# Patient Record
Sex: FEMALE | Race: WHITE | Employment: FULL TIME | URBAN - METROPOLITAN AREA
[De-identification: names, ages, dates, MRNs, and addresses within clinical notes are randomized per-mention and may not be internally consistent; named-entity substitution may affect disease eponyms.]

---

## 2020-01-25 LAB
CHOLESTEROL, TOTAL: 160 MG/DL
CHOLESTEROL/HDL RATIO: 2.3
HDLC SERPL-MCNC: 71 MG/DL (ref 35–70)
LDL CHOLESTEROL CALCULATED: 78 MG/DL (ref 0–160)
NONHDLC SERPL-MCNC: ABNORMAL MG/DL
TRIGL SERPL-MCNC: 53 MG/DL
VLDLC SERPL CALC-MCNC: 10.6 MG/DL

## 2021-04-24 LAB
AVERAGE GLUCOSE: 126
CREATININE, URINE: 181.4
HBA1C MFR BLD: 5.7 %
MICROALBUMIN/CREAT 24H UR: 0.7 MG/G{CREAT}
MICROALBUMIN/CREAT UR-RTO: 4

## 2021-08-23 ENCOUNTER — CLINICAL DOCUMENTATION (OUTPATIENT)
Dept: PHARMACY | Facility: CLINIC | Age: 61
End: 2021-08-23

## 2021-08-23 NOTE — PROGRESS NOTES
Pharmacy Pop Care Documentation:   Patient is missing the following requirements: DX: DM Type One or Type Two;  Provider Documentation of DM Visit; A1C; Lipid Panel; Urine Albumin; MyChart account creation: code sent via email provided. If completed by 8/25/21 patient will be eligible for enrollment in the DM Program on 9/01/21. Application Received: via RIDERS. Patient does not have an active CrossChx account. E-Mail sent to patient with the above information.     Colin Telles, Via [employer]   Department, toll free: [phone] Option #3

## 2021-08-23 NOTE — LETTER
Danyel [address]  [address], [address]  Phone: toll free [phone] Option #3        Nohemi Peck   [address] [address]           08/26/21     Dear Shannon Villegas! You have successfully enrolled in the Be Well With Diabetes program for 2021. What you receive  Beginning September 1, you will begin receiving up to $300 in waived copays for specific medications and pharmacy-related supplies purchased through our home delivery pharmacy, [facility]. A list of eligible medications and pharmacy supplies can be found at [employer]ate under Be Well With Diabetes. In addition, youll receive advice and help from our pharmacists, associate care management team and diabetes educators. (And, if you also participate in the Be Well program, you can earn points and Lifestyle Management or Health Management program credit, if applicable.)    What you need to do  To maintain your benefit this year and remain eligible next year, complete the following requirements:      *Can be satisfied through [employer] [facility] Screening on-site. **Requirements to enroll must be completed within 6 months of enrollment date **Pneumonia vaccine is dependent on previous immunization and your age. Remember, program requirements must be completed by deadlines shown. If not, your benefit may be terminated, and you will not be eligible to participate again until the following year. To keep you on track, well review your Fluential account and send reminders for action. (If you dont have a [address], submit documentation to [email or web address] or by fax to [phone].)    Congratulations and thank you for taking steps to improve your health and to Be Well With Diabetes. [facility] [facility]  Phone: [phone] Option #3  Email: [email or web address]  Fax: [phone]

## 2021-08-26 ENCOUNTER — CLINICAL DOCUMENTATION (OUTPATIENT)
Dept: PHARMACY | Facility: CLINIC | Age: 61
End: 2021-08-26

## 2021-08-26 NOTE — PROGRESS NOTES
Patient is a [employer] employee new to [facility]. Received documentation of the followin21: DM OV; DX: DM Type Two  21: A1C and Urine Albumin Results  20: Lipid Panel    Patient has upcoming appointment with new Provider on 21 at 4 PM and will send documentation of this visit to us as soon as possible. Patient needs  Lipid panel results but will enroll her into the DM Program on 21 and advise to complete this test as soon as possible. Pharmacy Pop Care Documentation:   Patient will be enrolled in the DM Program on 21. Letter and e-mail sent to patient.     Yanick Bray, Via [employer]   Department, toll free: [phone] Option #3

## 2021-08-27 LAB
CREATININE, URINE: 166.6
MICROALBUMIN/CREAT 24H UR: 1.3 MG/G{CREAT}
MICROALBUMIN/CREAT UR-RTO: 8

## 2021-09-01 ENCOUNTER — ENROLLMENT (OUTPATIENT)
Dept: PHARMACY | Facility: CLINIC | Age: 61
End: 2021-09-01

## 2021-09-02 ENCOUNTER — CLINICAL DOCUMENTATION (OUTPATIENT)
Dept: PHARMACY | Facility: CLINIC | Age: 61
End: 2021-09-02

## 2021-09-03 LAB
AVERAGE GLUCOSE: 123
CHOLESTEROL, TOTAL: 127 MG/DL
CHOLESTEROL/HDL RATIO: 2
CREATININE, URINE: 166.6
HBA1C MFR BLD: 5.9 %
HDLC SERPL-MCNC: 65 MG/DL (ref 35–70)
LDL CHOLESTEROL CALCULATED: 53 MG/DL (ref 0–160)
MICROALBUMIN/CREAT 24H UR: 1.3 MG/G{CREAT}
MICROALBUMIN/CREAT UR-RTO: 8
NONHDLC SERPL-MCNC: NORMAL MG/DL
TRIGL SERPL-MCNC: 47 MG/DL
VLDLC SERPL CALC-MCNC: 9.4 MG/DL

## 2021-09-14 ENCOUNTER — TELEPHONE (OUTPATIENT)
Dept: PHARMACY | Facility: CLINIC | Age: 61
End: 2021-09-14

## 2021-09-14 NOTE — TELEPHONE ENCOUNTER
Called patient to schedule 2021 yearly pharmacist appointment to discuss medications for Diabetes Management Program.    No answer. Left VM. Please call back at [phone], option #3     [employer] message sent to patient.       Drema Epley, [address]   Phone: [phone], option #3

## 2021-09-15 ENCOUNTER — CLINICAL DOCUMENTATION (OUTPATIENT)
Dept: PHARMACY | Facility: CLINIC | Age: 61
End: 2021-09-15

## 2021-09-15 NOTE — PROGRESS NOTES
Received the following documentation via fax/e-mail:  Health Provider Diabetes Screening Form  8/27/21: DM OV Summary: Burke Dickson DO  9/03/21: A1C, Lipid Panel, and Urine Albumin Results. Documentation has been scanned into patients EMR under the Media Tab.     Lucero Colvin, Via [facility]   Department, toll free: [phone] Option #3

## 2021-09-16 ENCOUNTER — SCHEDULED TELEPHONE ENCOUNTER (OUTPATIENT)
Dept: PHARMACY | Facility: CLINIC | Age: 61
End: 2021-09-16

## 2021-09-16 RX ORDER — FLUTICASONE PROPIONATE 50 MCG
1 SPRAY, SUSPENSION (ML) NASAL DAILY
COMMUNITY

## 2021-09-16 RX ORDER — SEMAGLUTIDE 1.34 MG/ML
1 INJECTION, SOLUTION SUBCUTANEOUS WEEKLY
COMMUNITY

## 2021-09-16 RX ORDER — BUPROPION HYDROCHLORIDE 150 MG/1
150 TABLET ORAL EVERY MORNING
COMMUNITY
End: 2022-09-22

## 2021-09-16 NOTE — TELEPHONE ENCOUNTER
[facility] HEALTH CLINICAL PHARMACY REVIEW - Be Well with Diabetes    Dione Modi is a 63 y.o. female enrolled in the [address] [place] [address] Diabetes Program.  Patient enrolled 9/1/21. Medications:  Current Outpatient Medications   Medication Sig Dispense Refill    Multiple Vitamins-Minerals (BARIATRIC MULTIVITAMINS/IRON PO) Take 1 tablet by mouth daily      Semaglutide, 1 MG/DOSE, (OZEMPIC, 1 MG/DOSE,) 4 MG/3ML SOPN Inject 1 mg into the skin once a week      buPROPion (WELLBUTRIN XL) 150 MG extended release tablet Take 150 mg by mouth every morning      fluticasone (FLONASE) 50 MCG/ACT nasal spray 1 spray by Each Nostril route daily       No current facility-administered medications for this visit. Current Pharmacy: [facility]  Current testing supplies/frequency: Prodigy meter and supplies. Patient says she is typically testing once daily in the morning. She did say she was having issues with getting her strips to register and would sometimes have to go through 2 or 3 of them. She is going to try a different can of strips and if still having issues I offered to get her agamtrix presto instead. Pen needles/syringes: n/a    Allergies: Allergies   Allergen Reactions    Metformin And Related Nausea Only      Vitals/Labs:  BP: 120/68mmhg at last OV in August    Microalbumin/SCr- WNL per scanned media    Lab Results   Component Value Date    LABA1C 5.9 09/03/2021    LABA1C 5.7 04/24/2021     Lab Results   Component Value Date    CHOL 127 09/03/2021    TRIG 47 09/03/2021    HDL 65 09/03/2021    LDLCALC 53 09/03/2021     No results found for: ALT, AST  The ASCVD Risk score (Rolf Gonzales., et al., 2013) failed to calculate for the following reasons:     The systolic blood pressure is missing    The valid total cholesterol range is 130 to 320 mg/dL    The smoking status is invalid    Unable to determine if patient is Non-      EGFR: >90 per scanned report    Immunizations:  Immunization History   Administered Date(s) Administered    Pneumococcal Polysaccharide ([id number]) 2014      Social History:  Social History     Tobacco Use    Smoking status: Not on file   Substance Use Topics    Alcohol use: Not on file     ASSESSMENT:  Initial Program Requirements (Y indicates has completed for the year, N indicates needs to be completed by- n/a- included for completeness):  Yes - Provider Visit for DM (1st)  Yes - A1c (1st)     Ongoing Program Requirements (Y indicates has completed for the year, N indicates needs to be completed by 2021):  Yes - Provider Visit for DM (2nd)  Yes - ACC/diabetes educator visit (if A1c over 8%)  Yes - A1c (2nd)  Yes - Lipid panel  Yes - Urine microalbumin  Yes - Pneumococcal vaccination: Up-to-date, not needed again until age 71  Yes - Influenza vaccination for 2021-  ICONIC required   Yes - Medication adherence over 70%  No - On statin or contraindication(s) LDL < 70 mg/dL  No - On ACEi/ARB or contraindication(s) Normal blood pressure, urinary albumin-to-creatinine ratio, and eGFR       Current medications eligible for copay waiver, up to $300 (600$ for full year), through [facility]:  - [facility]     Diabetes Care:   - Glycemic Goal: <6.5%. Stable and at blood glucose goal. Type 2 DM under excellent control as evidenced by multiple a1c <6%. - Home blood sugar records:  trend: stable, patient tests 1 time(s) per day  - Any episodes of hypoglycemia? Very Rare- Not using insulin, but says she has occasional readings in the low 70's. Likely due to bariatric surgery. Not concerning.  - Patient said that she had been on low dose lantus in the past. She would use on vary rare occasions. She reports that what she had was  and I advised against using it this way. Her a1c is already nearly perfect. - Patient has been under great control for quite some time.   The only

## 2021-10-14 ENCOUNTER — CLINICAL DOCUMENTATION (OUTPATIENT)
Dept: PHARMACY | Facility: CLINIC | Age: 61
End: 2021-10-14

## 2021-10-14 NOTE — PROGRESS NOTES
Pharmacy Pop Care Documentation:     AVS received for required office visit on: 8/27/21: Brandie Huang, DO - scanned on 9/15/21 into Media Tab

## 2022-01-05 ENCOUNTER — TELEPHONE (OUTPATIENT)
Dept: PHARMACY | Facility: CLINIC | Age: 62
End: 2022-01-05

## 2022-01-05 NOTE — TELEPHONE ENCOUNTER
2022 Annual Pharmacist Visit    Called patient to schedule 2022 yearly pharmacist appointment to discuss medications for Diabetes Management Program.    Spoke to patient and appointment scheduled for 1/14 at [facility].   Rita [address]   Department, toll free: [phone] Option #3    For Pharmacy Admin Tracking Only     CPA in place:  No   Recommendation Provided To: Patient/Caregiver: 1 via Telephone   Intervention Detail: Scheduled Appointment   Gap Closed?: Yes    Intervention Accepted By: Patient/Caregiver: 1   Time Spent (min): 15

## 2022-01-14 ENCOUNTER — SCHEDULED TELEPHONE ENCOUNTER (OUTPATIENT)
Dept: PHARMACY | Facility: CLINIC | Age: 62
End: 2022-01-14

## 2022-01-14 VITALS — DIASTOLIC BLOOD PRESSURE: 68 MMHG | SYSTOLIC BLOOD PRESSURE: 120 MMHG

## 2022-01-14 NOTE — TELEPHONE ENCOUNTER
[facility] HEALTH CLINICAL PHARMACY REVIEW - Be Well with Diabetes    Dione Camarena is a 57 y.o. female enrolled in the [address] [place] [address] Diabetes Program. Patient provided Ninoska Narayan with verbal consent to remain in the program for this year. Patient enrolled 9/1/2021. Medications:  Medication Sig    Multiple Vitamins-Minerals (BARIATRIC MULTIVITAMINS/IRON PO) Take 1 tablet by mouth daily    Semaglutide, 1 MG/DOSE, (OZEMPIC, 1 MG/DOSE,) 4 MG/3ML SOPN  · Covered by the DM program Inject 1 mg into the skin once a week    buPROPion (WELLBUTRIN XL) 150 MG extended release tablet Take 150 mg by mouth every morning    fluticasone (FLONASE) 50 MCG/ACT nasal spray 1 spray by Each Nostril route daily      Current Pharmacy[facility]  Current testing supplies/frequency: Prodigy - tests a fasting blood glucose in the morning. Allergies: Allergies   Allergen Reactions    Metformin And Related Nausea Only      Vitals/Labs:  BP Readings from Last 3 Encounters:   08/27/21 120/68     No results found for: Catherine Fox     Lab Results   Component Value Date    LABA1C 5.9 09/03/2021    LABA1C 5.7 04/24/2021     Lab Results   Component Value Date    CHOL 127 09/03/2021    TRIG 47 09/03/2021    HDL 65 09/03/2021    LDLCALC 53 09/03/2021     No results found for: ALT, AST  The ASCVD Risk score (Aaron Travis., et al., 2013) failed to calculate for the following reasons:     The systolic blood pressure is missing    The valid total cholesterol range is 130 to 320 mg/dL    The smoking status is invalid    Unable to determine if patient is Non-      Immunizations:  Immunization History   Administered Date(s) Administered    Pneumococcal Polysaccharide ([id number]) 01/01/2014      Social History:  Social History     Tobacco Use    Smoking status: Not on file    Smokeless tobacco: Not on file   Substance Use Topics    Alcohol use: Not on file ASSESSMENT:  Initial Program Requirements (Y indicates has completed for the year, N indicates needs to be completed by 07/01/2022): No - Provider Visit for DM (1st)  No - A1c (1st)     Ongoing Program Requirements (Y indicates has completed for the year, N indicates needs to be completed by 12/31/2022): No - Provider Visit for DM (2nd)  No - ACC/diabetes educator visit (if A1c over 8%)  No - A1c (2nd)  No - Lipid panel  No - Urine microalbumin  Yes - Pneumococcal vaccination: Up-to-date, not needed again until age 71  No - Influenza vaccination for Fall 2022  No - Medication adherence over 70%  Yes - On statin or contraindication(s) LDL < 70 mg/dL  Yes - On ACEi/ARB or contraindication(s) Normal blood pressure, urinary albumin-to-creatinine ratio, and eGFR     Formulary Medication Review:  Non-formulary or medications with cost-effective alternatives: none. Current medications eligible for copay waiver, up to $600, through [employer]:  - Ozempic  - Prodigy     Diabetes Care:   - Glycemic Goal: <7.0%. Stable and at blood glucose goal. Type 2 DM under excellent control as evidenced by < 7%. - Current symptoms/problems include none  - Home blood sugar records:  fasting range: 100's  - Any episodes of hypoglycemia? no  - Known diabetic complications: none  - Duplicate MOA: none  - Therapy Optimization: meeting goals. - De-escalation of Therapy: could consider decreasing Ozempic dose if still meeting goals. - Daily aspirin? No: not indicated. History of stomach cancer. Other Considerations:  - Blood Pressure Goal: BP less than 140/90 mmHg due to history of DM: Is at blood pressure goal.   - Lipids: LDL < 70  - Smoking status: Never smoked    PLAN:  - Consideration(s) for provider:   · None at this time.    - DM program gaps identified:   · Initial requirements: Provider Visit for DM (1st) and A1c (1st)   · Ongoing requirements: Provider visit for DM (2nd), ACC/diabetes educator visit (if A1c over 8%), A1c (2nd), Lipid panel, Urine microalbumin, Influenza vaccination for 9823-4333 and Medication adherence over 70%   - Follow up: PCP for identified gaps or as scheduled below  - Upcoming appointments:   No future appointments.     Werner LeosD, [facility] // Department, toll free [phone], option [address] [facility] [address] in place:  No    Gap Closed?: Yes    Time Spent (min): 60

## 2022-05-06 LAB
AVERAGE GLUCOSE: NORMAL
CHOLESTEROL, TOTAL: 149 MG/DL
CHOLESTEROL/HDL RATIO: 2
CREATININE, URINE: 237
HBA1C MFR BLD: 6.1 %
HDLC SERPL-MCNC: 75 MG/DL (ref 35–70)
LDL CHOLESTEROL CALCULATED: 66.4 MG/DL (ref 0–160)
MICROALBUMIN/CREAT 24H UR: 1.3 MG/G{CREAT}
MICROALBUMIN/CREAT UR-RTO: 5
NONHDLC SERPL-MCNC: ABNORMAL MG/DL
TRIGL SERPL-MCNC: 38 MG/DL
VLDLC SERPL CALC-MCNC: 7.6 MG/DL

## 2022-05-27 ENCOUNTER — CLINICAL DOCUMENTATION (OUTPATIENT)
Dept: PHARMACY | Facility: CLINIC | Age: 62
End: 2022-05-27

## 2022-05-27 NOTE — PROGRESS NOTES
Pharmacy Pop Care Documentation:     AVS received for required office visit on: 4/28/22: Navid Martinez D.O.

## 2022-06-21 RX ORDER — FLUCONAZOLE 150 MG/1
TABLET ORAL
COMMUNITY

## 2022-06-21 RX ORDER — BUPROPION HYDROCHLORIDE 150 MG/1
TABLET ORAL
COMMUNITY
Start: 2021-08-27

## 2022-06-21 RX ORDER — FLUTICASONE PROPIONATE 50 MCG
SPRAY, SUSPENSION (ML) NASAL
COMMUNITY
Start: 2021-08-27

## 2022-06-21 RX ORDER — ALBUTEROL SULFATE 90 UG/1
AEROSOL, METERED RESPIRATORY (INHALATION)
COMMUNITY
Start: 2021-12-17

## 2022-06-21 RX ORDER — AZITHROMYCIN 250 MG/1
TABLET, FILM COATED ORAL
COMMUNITY

## 2022-06-21 RX ORDER — IBUPROFEN 800 MG/1
TABLET ORAL
COMMUNITY

## 2022-06-21 RX ORDER — ATOMOXETINE 18 MG/1
CAPSULE ORAL
COMMUNITY
Start: 2022-04-28

## 2022-06-21 RX ORDER — BENZONATATE 200 MG/1
1 CAPSULE ORAL
COMMUNITY

## 2022-06-21 RX ORDER — LANSOPRAZOLE 30 MG/1
CAPSULE, DELAYED RELEASE ORAL
COMMUNITY

## 2022-06-28 RX ORDER — PREDNISONE 10 MG/1
TABLET ORAL
COMMUNITY

## 2022-06-28 RX ORDER — OMEPRAZOLE 40 MG/1
1 CAPSULE, DELAYED RELEASE ORAL
COMMUNITY

## 2022-11-17 ENCOUNTER — TELEPHONE (OUTPATIENT)
Dept: PHARMACY | Facility: CLINIC | Age: 62
End: 2022-11-17

## 2022-11-17 NOTE — TELEPHONE ENCOUNTER
[employer] patient called stating that her records recently switched to epic     Patients missing requirements for BWWD were  2nd office visit in 2022 for DM   2nd a1c in 2022      Per chart OV completed on 9/22/22   2nd a1c completed on 10/1/22    Master spreadsheet  still showing missing those two requirements. Will route to P to review.  Let patient know that we have all the documentation in chart and if we have any questions we will reach out to her

## 2022-11-17 NOTE — TELEPHONE ENCOUNTER
Patient has completed all requirements. There was a reporting issue which made it look like there were things missing. Will be fixed by the end of the month    Patient aware. Will sign off    WBird Farley, PharmD, [address] [facility], toll free: [phone]

## 2023-02-24 ENCOUNTER — TELEPHONE (OUTPATIENT)
Dept: PHARMACY | Facility: CLINIC | Age: 63
End: 2023-02-24

## 2023-02-24 NOTE — TELEPHONE ENCOUNTER
[facility] Floor Employee Diabetes Program - Be Well With Diabetes  =================================================================  Levon Jauregui is a 60 y.o. female enrolled in the [address] [place] [address] with patient for yearly visit to discuss enrollment in program. Patient provided writer with verbal consent to remain in the program for this year. Program Requirements to be completed in 2023:  Two office visits in 2023 for diabetes (1st must be completed by 7/1/2023)  Two A1c levels in 2023 (1st must be completed by 7/1/2023)  Yearly lipid panel  Yearly urine microalbumin test  Diabetes education if A1c is over 8%  Taking an ACE/ARB medication if appropriate  Taking a statin medication if appropriate  Pneumonia vaccine up to date. Guidelines changed, talk with provider. Yearly flu shot (for 4997-3932 season)  Medication adherence over 70%. Patients who fall below 70% will be contacted by a pharmacist in the program to discuss any issues. Are you currently using [address] [place] [address] (home delivery pharmacy) to get your prescriptions? Yes    Would you like to speak with a pharmacist about the program, your diabetes, and/or your prescriptions? No    [facility] Clinical Pharmacy Team  Phone: toll free [phone], option #3  Fax [phone]  Email: [email or web address]       For Pharmacy Admin Tracking Only    Program: [address] in [address]:  No  Gap Closed?: Yes   Time Spent (min): 10

## 2023-06-08 ENCOUNTER — PATIENT MESSAGE (OUTPATIENT)
Dept: PHARMACY | Facility: CLINIC | Age: 63
End: 2023-06-08

## 2023-06-15 PROBLEM — D50.9 IRON DEFICIENCY ANEMIA: Status: ACTIVE | Noted: 2023-06-15

## 2023-06-15 PROBLEM — H73.893 RETRACTION OF TYMPANIC MEMBRANE OF BOTH EARS: Status: ACTIVE | Noted: 2023-06-15

## 2023-06-15 PROBLEM — Z13.29 SCREENING FOR THYROID DISORDER: Status: ACTIVE | Noted: 2023-06-15

## 2023-06-15 PROBLEM — C16.9: Status: ACTIVE | Noted: 2023-06-15

## 2023-06-15 PROBLEM — M67.442 MUCOUS CYST OF DIGIT OF LEFT HAND: Status: ACTIVE | Noted: 2023-06-15

## 2023-06-15 PROBLEM — G25.81 RESTLESS LEGS: Status: ACTIVE | Noted: 2023-06-15

## 2023-06-15 PROBLEM — Z98.84 HISTORY OF GASTRIC BYPASS: Status: ACTIVE | Noted: 2023-06-15

## 2023-06-15 PROBLEM — J20.9 ACUTE BRONCHITIS: Status: ACTIVE | Noted: 2023-06-15

## 2023-06-15 PROBLEM — I10 HYPERTENSION: Status: ACTIVE | Noted: 2023-06-15

## 2023-06-15 PROBLEM — E11.9 TYPE 2 DIABETES MELLITUS WITHOUT COMPLICATION (HCC): Status: ACTIVE | Noted: 2023-06-15

## 2023-06-15 PROBLEM — Z13.220 ENCOUNTER FOR SCREENING FOR LIPID DISORDER: Status: ACTIVE | Noted: 2023-06-15

## 2023-06-15 PROBLEM — R05.9 COUGH: Status: ACTIVE | Noted: 2023-06-15

## 2023-06-15 PROBLEM — Z85.9 HISTORY OF MALIGNANT NEOPLASM: Status: ACTIVE | Noted: 2023-06-15

## 2023-06-15 PROBLEM — R41.840 ATTENTION AND CONCENTRATION DEFICIT: Status: ACTIVE | Noted: 2023-06-15

## 2023-06-15 PROBLEM — E66.9 OBESITY: Status: ACTIVE | Noted: 2023-06-15

## 2023-06-15 PROBLEM — Z12.31 ENCOUNTER FOR SCREENING MAMMOGRAM FOR MALIGNANT NEOPLASM OF BREAST: Status: ACTIVE | Noted: 2023-06-15

## 2023-06-15 PROBLEM — G47.00 INSOMNIA: Status: ACTIVE | Noted: 2023-06-15

## 2023-06-15 PROBLEM — H92.03 ACUTE PAIN OF BOTH EARS: Status: ACTIVE | Noted: 2023-06-15

## 2023-06-15 PROBLEM — Z85.028 HISTORY OF MALIGNANT NEOPLASM OF STOMACH: Status: ACTIVE | Noted: 2023-06-15

## 2023-06-15 PROBLEM — D3A.092 CARCINOID TUMOR OF STOMACH: Status: ACTIVE | Noted: 2023-06-15

## 2023-06-15 PROBLEM — M67.449 MUCOUS CYST OF FINGER: Status: ACTIVE | Noted: 2023-06-15

## 2023-06-15 PROBLEM — J30.9 ALLERGIC RHINITIS: Status: ACTIVE | Noted: 2023-06-15

## 2023-06-15 PROBLEM — R06.2 WHEEZING: Status: ACTIVE | Noted: 2023-06-15

## 2023-06-15 PROBLEM — E80.6 DISORDER OF BILIRUBIN EXCRETION: Status: ACTIVE | Noted: 2023-06-15

## 2023-06-15 PROBLEM — Z78.0 POSTMENOPAUSAL STATE: Status: ACTIVE | Noted: 2023-06-15

## 2023-06-15 PROBLEM — M67.441 MUCOUS CYST OF DIGIT OF RIGHT HAND: Status: ACTIVE | Noted: 2023-06-15

## 2023-06-15 PROBLEM — D64.89 OTHER SPECIFIED ANEMIAS: Status: ACTIVE | Noted: 2023-06-15

## 2023-06-19 PROBLEM — R73.03 PREDIABETES: Status: ACTIVE | Noted: 2023-06-19

## 2023-06-22 NOTE — TELEPHONE ENCOUNTER
1st 2023 DM OV completed on 6/15/23 and 1st 2023 A1C completed: 6/17/23. [employer] message read by patient. Requirements due by 7/01/23 complete. No further patient outreach planned at this time.      Chloe Ervin Via [employer] [facility]   Department, toll free: [phone] Option #3    For Pharmacy Admin Tracking Only    Program: [address] in place:  No  Gap Closed?: Yes   Time Spent (min): 5

## 2023-07-03 ENCOUNTER — CLINICAL DOCUMENTATION (OUTPATIENT)
Dept: PHARMACY | Facility: CLINIC | Age: 63
End: 2023-07-03

## 2023-07-03 NOTE — PROGRESS NOTES
Pharmacy Pop Care Documentation:     AVS received for required office visit on:  6/15/23 with Aguilar Presley, APRN - NP    Visit in [address] [place] [address], unclear why it did not pull on report.     Violette Garcia, PharmD,  [facility]  Department, toll free: [phone]      For Pharmacy Admin Tracking Only    Program: [address] [place] [address]  Time Spent (min): 5

## 2023-07-15 PROBLEM — R05.9 COUGH: Status: RESOLVED | Noted: 2023-06-15 | Resolved: 2023-07-15

## 2023-07-15 PROBLEM — Z12.31 ENCOUNTER FOR SCREENING MAMMOGRAM FOR MALIGNANT NEOPLASM OF BREAST: Status: RESOLVED | Noted: 2023-06-15 | Resolved: 2023-07-15

## 2023-07-15 PROBLEM — R25.2 HAND CRAMP: Status: ACTIVE | Noted: 2023-07-15

## 2023-07-15 PROBLEM — M54.9 DORSALGIA: Status: ACTIVE | Noted: 2023-07-15

## 2023-07-15 PROBLEM — Z13.220 ENCOUNTER FOR SCREENING FOR LIPID DISORDER: Status: RESOLVED | Noted: 2023-06-15 | Resolved: 2023-07-15

## 2023-07-15 PROBLEM — R25.2 MUSCLE CRAMPING: Status: ACTIVE | Noted: 2023-07-15

## 2023-07-15 PROBLEM — Z13.29 SCREENING FOR THYROID DISORDER: Status: RESOLVED | Noted: 2023-06-15 | Resolved: 2023-07-15

## 2023-08-14 PROBLEM — Z00.00 PREVENTATIVE HEALTH CARE: Chronic | Status: ACTIVE | Noted: 2023-06-15

## 2023-08-14 PROBLEM — Z00.00 PREVENTATIVE HEALTH CARE: Status: ACTIVE | Noted: 2023-06-15

## 2023-09-25 ENCOUNTER — CLINICAL DOCUMENTATION (OUTPATIENT)
Dept: PHARMACY | Facility: CLINIC | Age: 63
End: 2023-09-25

## 2023-09-25 NOTE — PROGRESS NOTES
Pharmacy Pop Care Documentation:     AVS received for required office visit on:  8/14/23- visit in epic, unclear why not transferred to report    TIBURCIO Cao, PharmD,  [facility], toll free: [phone]

## 2023-10-06 ENCOUNTER — TELEPHONE (OUTPATIENT)
Dept: PHARMACY | Facility: CLINIC | Age: 63
End: 2023-10-06

## 2023-10-06 NOTE — TELEPHONE ENCOUNTER
[employer] Employee Diabetes Program    Oleg Huntley is a 60 y.o. female enrolled in the Live Well with Diabetes Program. The goal of this voluntary program is to help employees and covered dependents reach their health maintenance goals in regards to their diabetes diagnosis. According to our records, patient is missing the following requirement(s) that must be completed by December 31, 2023 to avoid discharge from the program:  Second A1c result in 2023- ordered in chart  Urine Microalbumin- ordered in chart     Plan:  [employer] message sent.     Shay Benjamin, PharmD,  [facility]  Department, toll free: [phone]    For Pharmacy Admin Tracking Only    Program: [address] [place] [address]  Time Spent (min): 5

## 2024-01-25 ENCOUNTER — TELEPHONE (OUTPATIENT)
Dept: PHARMACY | Facility: CLINIC | Age: 64
End: 2024-01-25

## 2024-01-25 NOTE — TELEPHONE ENCOUNTER
**Patient is [employer]**  Called patient to schedule 2024 yearly pharmacist appointment to discuss medications for Diabetes Management Program.    No answer. Left VM on TAD: Please call back at [phone] Option #3.       Mohinder Metz [facility]  Clinical Pharmacy   Department, toll free: [phone] Option #3

## 2024-02-01 NOTE — TELEPHONE ENCOUNTER
Second attempt made to contact patient to schedule 2024 yearly pharmacist appointment to discuss medications for Diabetes Management Program.    No answer. Left VM on TAD: Please call back at [phone] Option #3.     [employer] message sent to patient.    Mohinder Metz [facility]  Clinical Pharmacy   Department, toll free: [phone] Option #3      For Pharmacy Admin Tracking Only    Program: [place]  CPA in place:  No  Gap Closed?: No   Time Spent (min): 5

## 2024-02-21 NOTE — TELEPHONE ENCOUNTER
POP[facility]ATION [facility] CLINICAL PHARMACY REVIEW - Live Well with Diabetes Program ([employer])    Dione Bob is a 63 y.o. female enrolled in the [employer] Employee Diabetes Program. Patient provided writer with verbal consent to remain in the program for this year. Patient enrolled 9/1/21.      Medications:  Current Outpatient Medications   Medication Instructions    albuterol sulfate HFA (PROVENTIL;VENTOLIN;PROAIR) 108 (90 Base) MCG/ACT inhaler 2 puffs as needed Inhalation every 4 hrs prn    atomoxetine (STRATTERA) 40 mg, Oral, DAILY    blood glucose monitor strips 1 strip, Other, 3 times daily, Test 3 times a day & as needed for symptoms of irregular blood glucose. Dispense sufficient amount for indicated testing frequency plus additional to accommodate PRN testing needs.    fluticasone (FLONASE) 50 MCG/ACT nasal spray 1 spray, Each Nostril, DAILY    gabapentin (NEURONTIN) 100 mg, Oral, 2 TIMES DAILY, Intended supply: 90 days    ibuprofen (ADVIL;MOTRIN) 800 MG tablet - taking as needed 1 tablet with food or milk as needed Orally Three times a day    Multiple Vitamins-Minerals (BARIATRIC MULTIVITAMINS/IRON PO) 1 tablet, Oral, DAILY    Ozempic (1 MG/DOSE) 1 mg, SubCUTAneous, WEEKLY   Last filled Ozempic 10/23/23 - states she is still taking; has new rx at [facility] now for refills     Other OTC/herbals: none per patient     Hypoglycemia treatment: States does have some low BG episodes and knows symptoms and how to treat.      Has patient refilled statin in 2024? N/A    Has patient refilled ACE/ARB in 2024? N/A      Pharmacy and Supplies Information  Current Pharmacy: [facility]  Current Testing supplies:Prodigy meter - states has enough testing supplies as she was previously on autorefill.  Reminded patient of test strip expiration.  Testing once daily in morning - states fasting BG is usually 110 or less.     Allergies:  Allergies   Allergen Reactions    Metformin And Related Diarrhea and Nausea Only

## 2024-02-22 ENCOUNTER — TELEPHONE (OUTPATIENT)
Dept: PHARMACY | Facility: CLINIC | Age: 64
End: 2024-02-22

## 2024-04-02 ENCOUNTER — CLINICAL DOCUMENTATION (OUTPATIENT)
Dept: PHARMACY | Facility: CLINIC | Age: 64
End: 2024-04-02

## 2024-04-02 ENCOUNTER — PATIENT MESSAGE (OUTPATIENT)
Dept: PHARMACY | Facility: CLINIC | Age: 64
End: 2024-04-02

## 2024-04-02 NOTE — PROGRESS NOTES
1st Quarterly Reminder sent to patient for the DM Program - See Mychart message or Letter for more information.    [facility]  [facility]  Clinical Pharmacy   Department, toll free: [phone] Option #3      For Pharmacy Admin Tracking Only    Program: [place]  CPA in place:  No  Gap Closed?: Yes   Time Spent (min): 5

## 2024-04-10 NOTE — TELEPHONE ENCOUNTER
[employer] message not read by patient.  Letter mailed to patient with the information from the [employer] message.    [facility]  Clinical Pharmacy   Department, toll free: [phone] Option #3      For Pharmacy Admin Tracking Only    Program: [employer]  CPA in place:  No  Gap Closed?: Yes   Time Spent (min): 5

## 2024-06-26 ENCOUNTER — TELEPHONE (OUTPATIENT)
Dept: PHARMACY | Facility: CLINIC | Age: 64
End: 2024-06-26

## 2024-06-26 NOTE — TELEPHONE ENCOUNTER
[facility] access hospital Employee Diabetes Program - Live Well With Diabetes    Quarterly Check-in    Congratulations! You have completed the following requirements needed to maintain enrollment in the Live Well With Diabetes Management program for 2024:        Meet with a Critical access hospital Clinical Pharmacist at least once yearly  Visit with your physician (first yearly visit)  First A1C  Lipid panel (once yearly)    Please review the information below for further requirements that need to be completed for the remainder of the year.    To ensure participants are taking steps to control their condition ongoing requirements need to be completed. To continue to receive the Live Well With Diabetes Program benefits, the following actions must be taken:     Requirements to be completed by Dec. 31  Visit with your physician (Second yearly visit)  Second A1C  Urine albumin (once yearly)    Requirements if A1C is greater than 8 percent:  Complete diabetes education or engage with an [place] care manager.    *Documentation of any requirements completed or reviewed outside of the Critical access hospital electronic medical record will need to be faxed or emailed (fax/email info below).    If the missing requirements(s) are not met by the date listed above, you will be disqualified from the program. If any patient is dis-enrolled from the program, then they must wait a full calendar year to re-enroll in the program.       [facility] hospital Population [facility] Pharmacy   Phone: [phone] Option #3  Email: [email or web address]  Fax Number: [phone]    For Pharmacy Admin Tracking Only    Program: [employer]  CPA in place:  No  Time Spent (min): 5

## 2024-09-27 PROBLEM — D3A.092 CARCINOID TUMOR OF STOMACH: Status: RESOLVED | Noted: 2023-06-15 | Resolved: 2024-09-27

## 2024-09-27 PROBLEM — S05.01XA ABRASION OF RIGHT CORNEA: Status: ACTIVE | Noted: 2024-09-27

## 2024-09-27 PROBLEM — C16.9: Status: RESOLVED | Noted: 2023-06-15 | Resolved: 2024-09-27

## 2024-10-07 ENCOUNTER — TELEPHONE (OUTPATIENT)
Dept: PHARMACY | Facility: CLINIC | Age: 64
End: 2024-10-07

## 2024-10-07 NOTE — TELEPHONE ENCOUNTER
[facility] Employee Diabetes Program - Live Well With Diabetes    Quarterly Check-in  Quarterly Reminder sent to patient for the DM Program - See Mychart message or Letter for more information  Sent Mychart/Letter  Saad Isbell, PharmD, BCACP  Ambulatory Care Clinical Pharmacist- [facility] Clinical Pharmacy  Department, toll free: [phone]      =======================================================    Mychart/Letter for participant:      Thanks so much for taking the first step towards better health.    Please review the information below for requirements that need to be completed for the remainder of the year.    To ensure participants are taking steps to control their condition ongoing requirements need to be completed. To receive the Live Well With Diabetes Program benefit for 2025, the following actions must be taken:     Requirements to be completed by 12/31/24  Second A1C  Urine Microalbumin (once yearly)    *Documentation of any requirements completed or reviewed outside of the [facility] electronic medical record will need to be faxed or emailed (fax/email info below).    If the missing requirements(s) are not met by the date listed above, you will be disqualified from the program and will not receive program benefits in 2025. If any patient is dis-enrolled from the program then they must wait a full calendar year to re-enroll in the program.   Please reach out to our team ASAP if there are any questions or concerns.    [facility]   Phone: [phone] Option #3  Email: [email or web address]  Fax Number: [phone]    For Pharmacy Admin Tracking Only    Program: [employer]  Time Spent (min): 5

## 2024-12-13 PROBLEM — C7A.092 MALIGNANT CARCINOID TUMOR OF STOMACH (HCC): Status: ACTIVE | Noted: 2024-12-13

## 2024-12-13 PROBLEM — R73.03 PREDIABETES: Status: RESOLVED | Noted: 2023-06-19 | Resolved: 2024-12-13

## 2024-12-17 ENCOUNTER — TELEPHONE (OUTPATIENT)
Dept: PHARMACY | Facility: CLINIC | Age: 64
End: 2024-12-17

## 2024-12-17 NOTE — TELEPHONE ENCOUNTER
[facility] Employee Diabetes Program - Live Well With Diabetes    Quarterly Check-in  Quarterly Reminder sent to patient for the DM Program - See Mychart message or Letter for more information  Sent Mychart/Letter  Saad Isbell, PharmD, [facility]  Ambulatory Care Clinical Pharmacist- [facility] Clinical Pharmacy  Department, toll free: [phone]      =======================================================    Mychart/Letter for participant:      Thanks so much for taking the first step towards better health.    All requirements complete for 2024  Congratulations! You have completed all of the requirements needed to maintain enrollment in the Live Well With Diabetes Management program for 2024. You will be automatically re-enrolled in 2025.  Please reach out with any questions or concerns. Thank you!    [facility]   Phone: [phone] Option #3  Email: [email or web address]  Fax Number: [phone]    For Pharmacy Admin Tracking Only    Program: [employer]  Time Spent (min): 5

## 2025-02-19 ENCOUNTER — TELEPHONE (OUTPATIENT)
Dept: PHARMACY | Facility: CLINIC | Age: 65
End: 2025-02-19

## 2025-02-19 NOTE — TELEPHONE ENCOUNTER
**Patient is Ensemble**  Called patient to schedule 2025 yearly pharmacist appointment to discuss medications for Diabetes Management Program.    No answer. Left VM.     Mohinder Metz [facility]  Clinical Pharmacy   Department, toll free: [phone] Option #3

## 2025-02-25 NOTE — TELEPHONE ENCOUNTER
Noted.    [facility]   Population Health Clinical   [facility] Clinical Pharmacy  Department, toll free: [phone], option 3

## 2025-02-25 NOTE — TELEPHONE ENCOUNTER
Second attempt made to contact patient to schedule 2025 yearly pharmacist appointment to discuss medications for Diabetes Management Program.    No answer. Left VM.     Letter mailed to patient    No further patient outreach planned at this time.    Mohinder Metz [facility]  Clinical Pharmacy   Department, toll free: [phone] Option #3        For Pharmacy Admin Tracking Only    Program: [employer]  CPA in place:  No  Gap Closed?: No   Time Spent (min): 5

## 2025-03-18 ENCOUNTER — TELEPHONE (OUTPATIENT)
Dept: PHARMACY | Facility: CLINIC | Age: 65
End: 2025-03-18

## 2025-03-18 NOTE — TELEPHONE ENCOUNTER
[facility] CLINICAL PHARMACY REVIEW - Live Well with Diabetes Program ([employer])    Dione Bob is a 64 y.o. female enrolled in the [employer] Employee Diabetes Program. Patient provided writer with verbal consent to remain in the program for this year. Patient enrolled 9/1/21.    Medications:  Current Outpatient Medications   Medication Instructions    albuterol sulfate HFA (PROVENTIL;VENTOLIN;PROAIR) 108 (90 Base) MCG/ACT inhaler 2 puffs as needed Inhalation every 4 hrs prn    atomoxetine (STRATTERA) 40 mg, Oral, DAILY    blood glucose monitor strips 1 strip, Other, 3 times daily, Test 3 times a day & as needed for symptoms of irregular blood glucose. Dispense sufficient amount for indicated testing frequency plus additional to accommodate PRN testing needs.    fluticasone (FLONASE) 50 MCG/ACT nasal spray 1 spray, Each Nostril, DAILY    gabapentin (NEURONTIN) 100 mg, Oral, 2 TIMES DAILY, Intended supply: 90 days    ibuprofen (ADVIL;MOTRIN) 800 MG tablet 1 tablet with food or milk as needed Orally Three times a day    Multiple Vitamins-Minerals (BARIATRIC MULTIVITAMINS/IRON PO) 1 tablet, DAILY    Semaglutide (1 MG/DOSE) (OZEMPIC) 1 mg, SubCUTAneous, EVERY 7 DAYS     Pharmacy and Supplies Information  Current Pharmacy: [facility]  Current Testing supplies:[employer]    Current medications eligible for copay waiver, up to $600, through [facility]:  - Ozempic  -  [employer]    Allergies:  Allergies   Allergen Reactions    Metformin And Related Diarrhea and Nausea Only    Nsaids Other (See Comments)     Event:    S/P gastric surgery        Vitals/Labs:  BP Readings from Last 3 Encounters:   12/13/24 136/74   09/27/24 124/68   08/14/23 122/70       Lab Results   Component Value Date    LABA1C 6.8 (H) 12/13/2024    LABA1C 6.4 (H) 06/12/2024    LABA1C 7.0 (H) 12/22/2023     No results found for: \"[id number]\"    Lab Results   Component Value Date    CHOL 162 06/12/2024    TRIG 48

## 2025-03-18 NOTE — TELEPHONE ENCOUNTER
Dione called in after missing her pharmacist appointment at 3 oclock.     She thought the phone number calling was a Medicare spam. Checking to see if pharmacist is available.     Routing to pharmacist.     Carli Longoria CPhT  Population Health    [facility] Clinical [facility]   [phone] option 1

## 2025-06-09 ENCOUNTER — PATIENT MESSAGE (OUTPATIENT)
Dept: PHARMACY | Facility: CLINIC | Age: 65
End: 2025-06-09

## 2025-06-09 ENCOUNTER — TELEPHONE (OUTPATIENT)
Dept: PHARMACY | Facility: CLINIC | Age: 65
End: 2025-06-09

## 2025-06-09 NOTE — TELEPHONE ENCOUNTER
patient is dis-enrolled from the program then they must wait a full calendar year to re-enroll in the program.   Please reach out to our team ASAP if there are any questions or concerns.    [facility]   Phone: [phone] Option #3  Email: [email or web address]  Fax Number: [phone]

## 2025-06-17 NOTE — TELEPHONE ENCOUNTER
[facility] message not read. Sending letter.       Cristel Johnson [facility]  Clinical Pharmacy   Phone: [phone], Option #3

## 2025-07-21 PROBLEM — C7A.092 MALIGNANT CARCINOID TUMOR OF STOMACH (HCC): Status: RESOLVED | Noted: 2024-12-13 | Resolved: 2025-07-21
